# Patient Record
Sex: FEMALE | Race: WHITE | NOT HISPANIC OR LATINO | Employment: FULL TIME | URBAN - METROPOLITAN AREA
[De-identification: names, ages, dates, MRNs, and addresses within clinical notes are randomized per-mention and may not be internally consistent; named-entity substitution may affect disease eponyms.]

---

## 2017-09-05 ENCOUNTER — TRANSCRIBE ORDERS (OUTPATIENT)
Dept: ADMINISTRATIVE | Facility: HOSPITAL | Age: 42
End: 2017-09-05

## 2017-09-05 ENCOUNTER — HOSPITAL ENCOUNTER (OUTPATIENT)
Dept: GENERAL RADIOLOGY | Facility: HOSPITAL | Age: 42
Discharge: HOME | End: 2017-09-05
Admitting: PHYSICIAN ASSISTANT

## 2017-09-05 DIAGNOSIS — M54.5 LOW BACK PAIN, UNSPECIFIED BACK PAIN LATERALITY, UNSPECIFIED CHRONICITY, WITH SCIATICA PRESENCE UNSPECIFIED: Primary | ICD-10-CM

## 2017-09-05 PROCEDURE — 72110 X-RAY EXAM L-2 SPINE 4/>VWS: CPT

## 2019-09-13 ENCOUNTER — HOSPITAL ENCOUNTER (EMERGENCY)
Facility: HOSPITAL | Age: 44
Discharge: HOME | End: 2019-09-13
Attending: EMERGENCY MEDICINE | Admitting: EMERGENCY MEDICINE

## 2019-09-13 VITALS
TEMPERATURE: 98.7 F | WEIGHT: 125 LBS | OXYGEN SATURATION: 99 % | DIASTOLIC BLOOD PRESSURE: 67 MMHG | HEART RATE: 81 BPM | HEIGHT: 63 IN | SYSTOLIC BLOOD PRESSURE: 101 MMHG | BODY MASS INDEX: 22.15 KG/M2 | RESPIRATION RATE: 16 BRPM

## 2019-09-13 DIAGNOSIS — A09 TRAVELER'S DIARRHEA: Primary | ICD-10-CM

## 2019-09-13 LAB
ALBUMIN SERPL-MCNC: 4.7 G/DL (ref 3.5–5.2)
ALBUMIN/GLOB SERPL: 1.3 G/DL
ALP SERPL-CCNC: 49 U/L (ref 39–117)
ALT SERPL W P-5'-P-CCNC: 59 U/L (ref 1–33)
ANION GAP SERPL CALCULATED.3IONS-SCNC: 14 MMOL/L (ref 5–15)
AST SERPL-CCNC: 71 U/L (ref 1–32)
BASOPHILS # BLD AUTO: 0.02 10*3/MM3 (ref 0–0.2)
BASOPHILS NFR BLD AUTO: 0.4 % (ref 0–1.5)
BILIRUB SERPL-MCNC: 0.4 MG/DL (ref 0.2–1.2)
BUN BLD-MCNC: 13 MG/DL (ref 6–20)
BUN/CREAT SERPL: 15.7 (ref 7–25)
CALCIUM SPEC-SCNC: 9.4 MG/DL (ref 8.6–10.5)
CHLORIDE SERPL-SCNC: 97 MMOL/L (ref 98–107)
CO2 SERPL-SCNC: 23 MMOL/L (ref 22–29)
CREAT BLD-MCNC: 0.83 MG/DL (ref 0.57–1)
DEPRECATED RDW RBC AUTO: 43.6 FL (ref 37–54)
EOSINOPHIL # BLD AUTO: 0.02 10*3/MM3 (ref 0–0.4)
EOSINOPHIL NFR BLD AUTO: 0.4 % (ref 0.3–6.2)
ERYTHROCYTE [DISTWIDTH] IN BLOOD BY AUTOMATED COUNT: 14 % (ref 12.3–15.4)
GFR SERPL CREATININE-BSD FRML MDRD: 75 ML/MIN/1.73
GLOBULIN UR ELPH-MCNC: 3.7 GM/DL
GLUCOSE BLD-MCNC: 95 MG/DL (ref 65–99)
HCT VFR BLD AUTO: 42.3 % (ref 34–46.6)
HGB BLD-MCNC: 13.6 G/DL (ref 12–15.9)
HOLD SPECIMEN: NORMAL
HOLD SPECIMEN: NORMAL
IMM GRANULOCYTES # BLD AUTO: 0.01 10*3/MM3 (ref 0–0.05)
IMM GRANULOCYTES NFR BLD AUTO: 0.2 % (ref 0–0.5)
LYMPHOCYTES # BLD AUTO: 0.81 10*3/MM3 (ref 0.7–3.1)
LYMPHOCYTES NFR BLD AUTO: 15.1 % (ref 19.6–45.3)
MAGNESIUM SERPL-MCNC: 1.9 MG/DL (ref 1.6–2.6)
MCH RBC QN AUTO: 27.6 PG (ref 26.6–33)
MCHC RBC AUTO-ENTMCNC: 32.2 G/DL (ref 31.5–35.7)
MCV RBC AUTO: 85.8 FL (ref 79–97)
MONOCYTES # BLD AUTO: 0.41 10*3/MM3 (ref 0.1–0.9)
MONOCYTES NFR BLD AUTO: 7.6 % (ref 5–12)
NEUTROPHILS # BLD AUTO: 4.11 10*3/MM3 (ref 1.7–7)
NEUTROPHILS NFR BLD AUTO: 76.3 % (ref 42.7–76)
NRBC BLD AUTO-RTO: 0 /100 WBC (ref 0–0.2)
PLATELET # BLD AUTO: 277 10*3/MM3 (ref 140–450)
PMV BLD AUTO: 9.9 FL (ref 6–12)
POTASSIUM BLD-SCNC: 3.7 MMOL/L (ref 3.5–5.2)
PROT SERPL-MCNC: 8.4 G/DL (ref 6–8.5)
RBC # BLD AUTO: 4.93 10*6/MM3 (ref 3.77–5.28)
SODIUM BLD-SCNC: 134 MMOL/L (ref 136–145)
TROPONIN T SERPL-MCNC: <0.01 NG/ML (ref 0–0.03)
WBC NRBC COR # BLD: 5.38 10*3/MM3 (ref 3.4–10.8)
WHOLE BLOOD HOLD SPECIMEN: NORMAL
WHOLE BLOOD HOLD SPECIMEN: NORMAL

## 2019-09-13 PROCEDURE — 87046 STOOL CULTR AEROBIC BACT EA: CPT | Performed by: PHYSICIAN ASSISTANT

## 2019-09-13 PROCEDURE — 25010000002 ONDANSETRON PER 1 MG: Performed by: PHYSICIAN ASSISTANT

## 2019-09-13 PROCEDURE — 84484 ASSAY OF TROPONIN QUANT: CPT

## 2019-09-13 PROCEDURE — 99284 EMERGENCY DEPT VISIT MOD MDM: CPT

## 2019-09-13 PROCEDURE — 96374 THER/PROPH/DIAG INJ IV PUSH: CPT

## 2019-09-13 PROCEDURE — 96361 HYDRATE IV INFUSION ADD-ON: CPT

## 2019-09-13 PROCEDURE — 85025 COMPLETE CBC W/AUTO DIFF WBC: CPT

## 2019-09-13 PROCEDURE — 80053 COMPREHEN METABOLIC PANEL: CPT

## 2019-09-13 PROCEDURE — 83735 ASSAY OF MAGNESIUM: CPT

## 2019-09-13 PROCEDURE — 93005 ELECTROCARDIOGRAM TRACING: CPT

## 2019-09-13 PROCEDURE — 93005 ELECTROCARDIOGRAM TRACING: CPT | Performed by: EMERGENCY MEDICINE

## 2019-09-13 PROCEDURE — 87045 FECES CULTURE AEROBIC BACT: CPT | Performed by: PHYSICIAN ASSISTANT

## 2019-09-13 RX ORDER — SODIUM CHLORIDE 0.9 % (FLUSH) 0.9 %
10 SYRINGE (ML) INJECTION AS NEEDED
Status: DISCONTINUED | OUTPATIENT
Start: 2019-09-13 | End: 2019-09-13 | Stop reason: HOSPADM

## 2019-09-13 RX ORDER — CIPROFLOXACIN 500 MG/1
500 TABLET, FILM COATED ORAL 2 TIMES DAILY
Qty: 6 TABLET | Refills: 0 | Status: SHIPPED | OUTPATIENT
Start: 2019-09-13

## 2019-09-13 RX ORDER — ONDANSETRON 2 MG/ML
4 INJECTION INTRAMUSCULAR; INTRAVENOUS ONCE
Status: COMPLETED | OUTPATIENT
Start: 2019-09-13 | End: 2019-09-13

## 2019-09-13 RX ORDER — ONDANSETRON 4 MG/1
4 TABLET, ORALLY DISINTEGRATING ORAL EVERY 6 HOURS PRN
Qty: 12 TABLET | Refills: 0 | Status: SHIPPED | OUTPATIENT
Start: 2019-09-13

## 2019-09-13 RX ADMIN — SODIUM CHLORIDE 2000 ML: 9 INJECTION, SOLUTION INTRAVENOUS at 16:39

## 2019-09-13 RX ADMIN — ONDANSETRON 4 MG: 2 INJECTION INTRAMUSCULAR; INTRAVENOUS at 16:43

## 2019-09-13 NOTE — DISCHARGE INSTRUCTIONS
Rest.  Plenty of fluids.  Cipro as prescribed.  Return to the emergency department if symptoms persist or worsen.  Choose a provider from the list below to establish a PCP and make follow-up appointment.  Follow up with one of the [facility] Primary Care Providers below to setup primary care. If you need assistance coordinating a primary care appointment with a [facility] Primary Care Provider, please contact the Primary Care Coordinators at [phone] for appointment scheduling.    [facility], Primary Care   [address], [address]   [place], [place] [id number] [phone]    [facility] Endocrinology  [address], [address]  [place] [address] [phone]    [facility]  [address], [address]   [place] [address] [phone]    [facility]  [address], [address]  [place], [place] [address] [phone]    [facility] Primary Care,   [address], [address]   [place], [place] [id number] [phone]    [facility]  [address], [address]  [place], [place] [address] [phone]    [facility] Primary [facility]  [address], [address]  [place], [place] [id number] [phone]    [facility] Pediatric[facility]  [address], [address]   [place] [address] [phone]    [facility], Primary Care  [address] [place] [address] [phone]      [facility]  [address], [address]   [place] [address] [phone]    [facility]  [address] [place], [place] [address] [phone]

## 2019-09-13 NOTE — ED PROVIDER NOTES
Subjective   Mila Bella is a 43 y.o. female who presents to the ED with complaints of syncope yesterday morning while in the shower around 0300. She also complains of lightheadedness, chest discomfort, chills, intermittent abdominal cramping, nausea, decreased urine, and diarrhea for the past 2 days. She denies any head injury, vomiting, dysuria, shortness of breath, cough, or fever. She reports that she recently traveled to [place] and ate lunch there from 9/9-9/11. She has a history of rheumatoid arthritis. She denies any history of C. Diff.  She does not have a PCP. She occasionally smokes and rarely drinks. She denies any drug use. There are no other acute complaints at this time.        History provided by:  Patient  Syncope   Episode history:  Single  Most recent episode:  Yesterday  Timing:  Unable to specify  Progression:  Resolved  Chronicity:  New  Context: standing up    Witnessed: no    Relieved by:  None tried  Worsened by:  Nothing  Ineffective treatments:  None tried  Associated symptoms: chest pain (discomfort) and nausea    Associated symptoms: no fever, no headaches, no shortness of breath and no vomiting        Review of Systems   Constitutional: Positive for chills. Negative for fever.   Respiratory: Negative for cough and shortness of breath.    Cardiovascular: Positive for chest pain (discomfort) and syncope.   Gastrointestinal: Positive for abdominal pain (cramping), diarrhea and nausea. Negative for vomiting.   Genitourinary: Positive for decreased urine volume. Negative for dysuria.   Musculoskeletal: Negative for back pain.   Skin: Negative for rash.   Allergic/Immunologic: Positive for immunocompromised state.   Neurological: Positive for syncope and light-headedness. Negative for headaches.   Hematological: Negative.    Psychiatric/Behavioral: Negative.    All other systems reviewed and are negative.      Past Medical History:   Diagnosis Date   • Rheumatoid arthritis (CMS/Formerly [facility])         No Known Allergies    Past Surgical History:   Procedure Laterality Date   •  SECTION         History reviewed. No pertinent family history.    Social History     Socioeconomic History   • Marital status:      Spouse name: Not on file   • Number of children: Not on file   • Years of education: Not on file   • Highest education level: Not on file   Tobacco Use   • Smoking status: Current Every Day Smoker         Objective   Physical Exam   Constitutional: She is oriented to person, place, and time. She appears well-developed and well-nourished. No distress.   HENT:   Head: Normocephalic and atraumatic.   Nose: Nose normal.   Mouth/Throat: Oropharynx is clear and moist. Mucous membranes are dry.   Dry mucous membranes.    Eyes: Conjunctivae are normal. Pupils are equal, round, and reactive to light. No scleral icterus.   Neck: Normal range of motion. Neck supple.   Cardiovascular: Normal rate, regular rhythm, normal heart sounds and intact distal pulses.   No murmur heard.  Good distal pulses.   Pulmonary/Chest: Effort normal and breath sounds normal. No respiratory distress.   Abdominal: Soft. Bowel sounds are normal. She exhibits no distension. There is tenderness in the right lower quadrant and left lower quadrant.   Mild diffused lower abdominal tenderness.    Musculoskeletal: Normal range of motion. She exhibits no edema.   No lower extremity edema.    Neurological: She is alert and oriented to person, place, and time.   Skin: Skin is warm and dry.   Psychiatric: She has a normal mood and affect. Her behavior is normal.   Nursing note and vitals reviewed.      Procedures         ED Course  ED Course as of Sep 13 2301   Fri Sep 13, 2019   1924 At bedside re-evaluating the patient and updating her on labs. -HNB  [NP]      ED Course User Index  [NP] Kadi Hopkins    11:01 PM  Patient appears in no acute distress.  Labs are bland with a normal white count 5.38.  Normal renal functions.  LFTs are  slightly bumped.  The patient was hydrated with 2 L of saline here.  Given her traveler's diarrhea, I will plan to discharge her on Cipro and have her follow-up or return if worse.  Recent Results (from the past 24 hour(s))   Comprehensive Metabolic Panel    Collection Time: 09/13/19  2:39 PM   Result Value Ref Range    Glucose 95 65 - 99 mg/dL    BUN 13 6 - 20 mg/dL    Creatinine 0.83 0.57 - 1.00 mg/dL    Sodium 134 (L) 136 - 145 mmol/L    Potassium 3.7 3.5 - 5.2 mmol/L    Chloride 97 (L) 98 - 107 mmol/L    CO2 23.0 22.0 - 29.0 mmol/L    Calcium 9.4 8.6 - 10.5 mg/dL    Total Protein 8.4 6.0 - 8.5 g/dL    Albumin 4.70 3.50 - 5.20 g/dL    ALT (SGPT) 59 (H) 1 - 33 U/L    AST (SGOT) 71 (H) 1 - 32 U/L    Alkaline Phosphatase 49 39 - 117 U/L    Total Bilirubin 0.4 0.2 - 1.2 mg/dL    eGFR Non African Amer 75 >60 mL/min/1.73    Globulin 3.7 gm/dL    A/G Ratio 1.3 g/dL    BUN/Creatinine Ratio 15.7 7.0 - 25.0    Anion Gap 14.0 5.0 - 15.0 mmol/L   Magnesium    Collection Time: 09/13/19  2:39 PM   Result Value Ref Range    Magnesium 1.9 1.6 - 2.6 mg/dL   Troponin    Collection Time: 09/13/19  2:39 PM   Result Value Ref Range    Troponin T <0.010 0.000 - 0.030 ng/mL   Light Blue Top    Collection Time: 09/13/19  2:39 PM   Result Value Ref Range    Extra Tube hold for add-on    Green Top (Gel)    Collection Time: 09/13/19  2:39 PM   Result Value Ref Range    Extra Tube Hold for add-ons.    Lavender Top    Collection Time: 09/13/19  2:39 PM   Result Value Ref Range    Extra Tube hold for add-on    Gold Top - SST    Collection Time: 09/13/19  2:39 PM   Result Value Ref Range    Extra Tube Hold for add-ons.    CBC Auto Differential    Collection Time: 09/13/19  2:39 PM   Result Value Ref Range    WBC 5.38 3.40 - 10.80 10*3/mm3    RBC 4.93 3.77 - 5.28 10*6/mm3    Hemoglobin 13.6 12.0 - 15.9 g/dL    Hematocrit 42.3 34.0 - 46.6 %    MCV 85.8 79.0 - 97.0 fL    MCH 27.6 26.6 - 33.0 pg    MCHC 32.2 31.5 - 35.7 g/dL    RDW 14.0 12.3 -  15.4 %    RDW-SD 43.6 37.0 - 54.0 fl    MPV 9.9 6.0 - 12.0 fL    Platelets 277 140 - 450 10*3/mm3    Neutrophil % 76.3 (H) 42.7 - 76.0 %    Lymphocyte % 15.1 (L) 19.6 - 45.3 %    Monocyte % 7.6 5.0 - 12.0 %    Eosinophil % 0.4 0.3 - 6.2 %    Basophil % 0.4 0.0 - 1.5 %    Immature Grans % 0.2 0.0 - 0.5 %    Neutrophils, Absolute 4.11 1.70 - 7.00 10*3/mm3    Lymphocytes, Absolute 0.81 0.70 - 3.10 10*3/mm3    Monocytes, Absolute 0.41 0.10 - 0.90 10*3/mm3    Eosinophils, Absolute 0.02 0.00 - 0.40 10*3/mm3    Basophils, Absolute 0.02 0.00 - 0.20 10*3/mm3    Immature Grans, Absolute 0.01 0.00 - 0.05 10*3/mm3    nRBC 0.0 0.0 - 0.2 /100 WBC     Note: In addition to lab results from this visit, the labs listed above may include labs taken at another facility or during a different encounter within the last 24 hours. Please correlate lab times with ED admission and discharge times for further clarification of the services performed during this visit.    No orders to display     Vitals:    09/13/19 1635 09/13/19 1637 09/13/19 1800 09/13/19 1948   BP: (S) 99/73 (S) 106/74 101/63 101/67   BP Location:       Patient Position: (S) Sitting (S) Standing     Pulse: (S) 84 (S) 100 76 81   Resp:    16   Temp:       TempSrc:       SpO2:   99% 99%   Weight:       Height:         Medications   sodium chloride 0.9 % bolus 2,000 mL (0 mL Intravenous Stopped 9/13/19 1837)   ondansetron (ZOFRAN) injection 4 mg (4 mg Intravenous Given 9/13/19 1643)     ECG/EMG Results (last 24 hours)     Procedure Component Value Units Date/Time    ECG 12 Lead [[id number]] Collected:  09/13/19 1423     Updated:  09/13/19 1456        ECG 12 Lead                               MDM    Final diagnoses:   Traveler's diarrhea       Documentation assistance provided by kerri Hopkins.  Information recorded by the scribe was done at my direction and has been verified and validated by me.     Kadi Hopkins  09/13/19 1558       Jasper Goyal PA  09/13/19  1917       Jasper Goyal, PA  09/13/19 9350

## 2019-09-17 LAB
CAMPYLOBACTER STL CULT: NORMAL
E COLI SXT STL QL IA: NEGATIVE
Lab: NORMAL
Lab: NORMAL
SALM + SHIG STL CULT: NORMAL